# Patient Record
Sex: FEMALE | Race: WHITE | NOT HISPANIC OR LATINO | URBAN - METROPOLITAN AREA
[De-identification: names, ages, dates, MRNs, and addresses within clinical notes are randomized per-mention and may not be internally consistent; named-entity substitution may affect disease eponyms.]

---

## 2019-03-01 ENCOUNTER — COMMUNICATION - HEALTHEAST (OUTPATIENT)
Dept: NEUROSURGERY | Facility: CLINIC | Age: 84
End: 2019-03-01

## 2019-03-01 DIAGNOSIS — I60.9 SAH (SUBARACHNOID HEMORRHAGE) (H): ICD-10-CM

## 2019-03-01 DIAGNOSIS — S06.5XAA SDH (SUBDURAL HEMATOMA) (H): ICD-10-CM

## 2019-03-04 ENCOUNTER — AMBULATORY - HEALTHEAST (OUTPATIENT)
Dept: OTHER | Facility: CLINIC | Age: 84
End: 2019-03-04

## 2019-03-11 ENCOUNTER — COMMUNICATION - HEALTHEAST (OUTPATIENT)
Dept: NEUROSURGERY | Facility: CLINIC | Age: 84
End: 2019-03-11

## 2019-03-12 ENCOUNTER — OFFICE VISIT - HEALTHEAST (OUTPATIENT)
Dept: NEUROSURGERY | Facility: CLINIC | Age: 84
End: 2019-03-12

## 2019-03-12 ENCOUNTER — HOSPITAL ENCOUNTER (OUTPATIENT)
Dept: CT IMAGING | Facility: CLINIC | Age: 84
Discharge: HOME | End: 2019-03-12
Attending: SURGERY

## 2019-03-12 DIAGNOSIS — S06.5XAA SDH (SUBDURAL HEMATOMA) (H): ICD-10-CM

## 2019-03-12 DIAGNOSIS — I60.9 SAH (SUBARACHNOID HEMORRHAGE) (H): ICD-10-CM

## 2019-06-01 ENCOUNTER — RECORDS - HEALTHEAST (OUTPATIENT)
Dept: LAB | Facility: CLINIC | Age: 84
End: 2019-06-01

## 2019-06-01 LAB
ALBUMIN UR-MCNC: ABNORMAL MG/DL
APPEARANCE UR: ABNORMAL
BACTERIA #/AREA URNS HPF: ABNORMAL HPF
BILIRUB UR QL STRIP: NEGATIVE
CAOX CRY #/AREA URNS HPF: PRESENT /[HPF]
COLOR UR AUTO: YELLOW
GLUCOSE UR STRIP-MCNC: NEGATIVE MG/DL
HGB UR QL STRIP: NEGATIVE
KETONES UR STRIP-MCNC: NEGATIVE MG/DL
LEUKOCYTE ESTERASE UR QL STRIP: ABNORMAL
MUCOUS THREADS #/AREA URNS LPF: ABNORMAL LPF
NITRATE UR QL: NEGATIVE
PH UR STRIP: 5.5 [PH] (ref 4.5–8)
RBC #/AREA URNS AUTO: ABNORMAL HPF
SP GR UR STRIP: 1.02 (ref 1–1.03)
SQUAMOUS #/AREA URNS AUTO: ABNORMAL LPF
UROBILINOGEN UR STRIP-ACNC: ABNORMAL
WBC #/AREA URNS AUTO: ABNORMAL HPF

## 2019-06-04 LAB — BACTERIA SPEC CULT: ABNORMAL
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
## 2021-06-15 PROBLEM — S70.02XA HEMATOMA OF LEFT HIP, INITIAL ENCOUNTER: Status: ACTIVE | Noted: 2017-03-04
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
## 2021-06-15 PROBLEM — S06.0XAA CONCUSSION: Status: ACTIVE | Noted: 2017-03-03

## 2021-06-16 PROBLEM — I82.403 LEG DVT (DEEP VENOUS THROMBOEMBOLISM), ACUTE, BILATERAL (H): Status: ACTIVE | Noted: 2019-04-10

## 2021-06-16 PROBLEM — I60.9 SUBARACHNOID HEMORRHAGE (H): Status: ACTIVE | Noted: 2019-02-26

## 2021-06-16 PROBLEM — I60.9 SAH (SUBARACHNOID HEMORRHAGE) (H): Status: ACTIVE | Noted: 2019-02-26

## 2021-06-16 PROBLEM — S06.5XAA SUBDURAL HEMATOMA (H): Status: ACTIVE | Noted: 2019-02-26

## 2021-06-16 PROBLEM — R53.1 WEAKNESS: Status: ACTIVE | Noted: 2019-04-10

## 2021-06-18 NOTE — LETTER
Letter by Delaney Jackson RN at      Author: Delaney Jackson RN Service: -- Author Type: --    Filed:  Encounter Date: 3/4/2019 Status: (Other)       [facility]-[address] [address]            Sukhjames YANELY Rea  [address]    [address]  [place] [id number]    Dear Mrs. Rea,    We have reviewed the Health Care Directive dated 7/1/09 which you presented for addition to   your medical record. Unfortunately, our review indicates the document is not legally valid for   the following reason(s): there appears to be one or more pages missing.  Your document indicates you were attaching additional information. We did not receive any additional pages with your document.    In order to create a legal document you will need to send us a copy of the attachments for this document. If you did not intend to state you had attachments, please notify us by mail or the email address below.     We greatly value the opportunity to assist you in documenting your choices and to honor your   wishes. We apologize for any inconvenience. We have several options to assist you in updating   your document so it meets legal requirements.       Health Care Directives and Advance Care Planning resources can be viewed and printed   for free at our web site:[email or web address].       COPIES of completed Health Care Directives can be brought or mailed to any of our   locations, including the address listed below. You can also email a copy to [email or web address] .       For additional assistance or questions you can email us at [email or web address] or call [phone]      Sincerely,     Meredith Hurst Advance Care Planning  [facility]  [address] [place] [id number]   Email us: [email or web address] Call us: [phone]  Visit at: [email or web address]

## 2021-06-24 NOTE — PROGRESS NOTES
NEUROSURGERY FOLLOW UP EVALUATION:    ASSESSMENT/ PLAN:  Farida Rea is a 89 y.o. female patient of Dr. Josh Hickman, with history of dementia fell at her memory care unit hitting her head 2/27/19. This was an unwitnessed fall. . CT showed 2 mm SDH in left frontal lobe and 2 mm SAH in right temporal. Today SDH is stable and SAH is resolved.    PLAN:  1.  Interval improvement in the SDH and resolution of the SAH.   2.  No further follow up recommended at this time.   3.  May slowly increase activity.   4.  Avoid falling and hitting head.    Today she reports no pain. No headache. She is not a good historian. She is conversational and very pleasant.     EXAM:    Alert and oriented to self. She tells me she's at [facility] because she read it on my lab coat, which is clever. She looked good but really has no idea what is going on and why she's here.   PERRL, EOMI, face symmetric, tongue midline, shoulder shrug equal  No pronator drift, finger to nose smooth and accurate bilaterally  Arm strength: 5/5  Leg strength: 5/5   Bulk and tone: normal  Gait seen in wheel chair  Facial bruising on left side of face.      IMAGING:  The imaging was personally reviewed by me.   1.  Expected maturation of small left frontal convexity subdural hemorrhage as above. Interval resolution of right temporal lobe subarachnoid hemorrhage. No new acute intracranial hemorrhage or associated mass effect.  2.  Background of age-related and chronic ischemic changes similar to the previous exam    Maritza Anthony, SORAYA  [facility] Neurosurgery

## 2021-06-24 NOTE — TELEPHONE ENCOUNTER
PATIENT NAME:  Farida Rea  YOB: 1927  MRN: [id number]  SURGEON:   DATE of CONSULT:  2-26-19  DIAGNOSIS:  TRAUMATIC SDH, SAH    FOLLOW-UP:  Post HOSPITAL CONSULT F/U Visit: 2 weeks   Post-op Provider: NP  DIAGNOSTICS:  radiology: CT scan: HEAD, WITHOUT  (ORDERED 3-1-19)  DISPOSITION:  NS SIGNED OFF BUT PT WILL LIKELY DISCHARGE TO TCU:   [place], [facility] OR [facility]. CHECK WITH [facility]IZ [facility] IF CAN'T LOCATE HER.     ADDITIONAL INSTRUCTIONS FOR MEDICAL STAFF:

## 2021-06-24 NOTE — TELEPHONE ENCOUNTER
Daughter cancelled appointment. Does not want to re schedule or bring patient back into clinic. Does not feel that patient wants to come in or that patient should have more radiation.   Neelam (daughter) would like a call to discuss symptoms that patient is having. [phone]

## 2021-06-24 NOTE — PATIENT INSTRUCTIONS - HE
1.  Interval improvement in the SDH and resolution of the SAH.   2.  No further follow up recommended at this time.   3.  May slowly increase activity.   4.  Avoid falling and hitting head.

## 2021-06-24 NOTE — TELEPHONE ENCOUNTER
Clotilde, Scheduled appointments. I contacted all the [facility] locations and patient is not with them. Can you see where patient was discharged to?    Appointment-   03/12/2019 at 1:25 head CT at [facility]  03/12/2019 at 2:30 Appointment in clinic

## 2021-06-26 ENCOUNTER — HEALTH MAINTENANCE LETTER (OUTPATIENT)
Age: 86
End: 2021-06-26

## 2021-10-16 ENCOUNTER — HEALTH MAINTENANCE LETTER (OUTPATIENT)
Age: 86
End: 2021-10-16

## 2022-07-23 ENCOUNTER — HEALTH MAINTENANCE LETTER (OUTPATIENT)
Age: 87
End: 2022-07-23

## 2022-10-01 ENCOUNTER — HEALTH MAINTENANCE LETTER (OUTPATIENT)
Age: 87
End: 2022-10-01

## 2023-08-06 ENCOUNTER — HEALTH MAINTENANCE LETTER (OUTPATIENT)
Age: 88
End: 2023-08-06